# Patient Record
Sex: FEMALE | Race: WHITE | NOT HISPANIC OR LATINO | Employment: UNEMPLOYED | URBAN - METROPOLITAN AREA
[De-identification: names, ages, dates, MRNs, and addresses within clinical notes are randomized per-mention and may not be internally consistent; named-entity substitution may affect disease eponyms.]

---

## 2021-05-04 LAB
EXTERNAL HEPATITIS B SURFACE ANTIGEN: NEGATIVE
EXTERNAL HEPATITIS C AB: NEGATIVE
EXTERNAL RUBELLA QUALITATIVE: NORMAL
EXTERNAL SYPHILIS RPR SCREEN: NORMAL
HIV1 P24 AG SERPL QL IA: NEGATIVE

## 2021-09-08 ENCOUNTER — HOSPITAL ENCOUNTER (OUTPATIENT)
Facility: HOSPITAL | Age: 26
End: 2021-09-08
Attending: OBSTETRICS & GYNECOLOGY | Admitting: OBSTETRICS & GYNECOLOGY

## 2021-09-08 ENCOUNTER — HOSPITAL ENCOUNTER (EMERGENCY)
Facility: HOSPITAL | Age: 26
Discharge: HOME | End: 2021-09-08
Attending: OBSTETRICS & GYNECOLOGY | Admitting: OBSTETRICS & GYNECOLOGY

## 2021-09-08 VITALS
TEMPERATURE: 98.9 F | DIASTOLIC BLOOD PRESSURE: 66 MMHG | WEIGHT: 206.35 LBS | SYSTOLIC BLOOD PRESSURE: 130 MMHG | BODY MASS INDEX: 36.56 KG/M2 | HEART RATE: 67 BPM | OXYGEN SATURATION: 99 % | RESPIRATION RATE: 18 BRPM | HEIGHT: 63 IN

## 2021-09-08 PROCEDURE — 99283 EMERGENCY DEPT VISIT LOW MDM: CPT | Performed by: OBSTETRICS & GYNECOLOGY

## 2021-09-08 PROCEDURE — 59025 FETAL NON-STRESS TEST: CPT

## 2021-09-08 RX ORDER — PRENATAL VIT NO.126/IRON/FOLIC 28MG-0.8MG
TABLET ORAL DAILY
COMMUNITY

## 2021-11-21 ENCOUNTER — HOSPITAL ENCOUNTER (EMERGENCY)
Facility: HOSPITAL | Age: 26
Discharge: HOME | End: 2021-11-21
Attending: OBSTETRICS & GYNECOLOGY | Admitting: OBSTETRICS & GYNECOLOGY

## 2021-11-21 ENCOUNTER — HOSPITAL ENCOUNTER (OUTPATIENT)
Facility: HOSPITAL | Age: 26
End: 2021-11-21
Attending: OBSTETRICS & GYNECOLOGY | Admitting: OBSTETRICS & GYNECOLOGY

## 2021-11-21 VITALS
SYSTOLIC BLOOD PRESSURE: 128 MMHG | DIASTOLIC BLOOD PRESSURE: 72 MMHG | HEIGHT: 60 IN | OXYGEN SATURATION: 100 % | WEIGHT: 218 LBS | RESPIRATION RATE: 18 BRPM | HEART RATE: 74 BPM | BODY MASS INDEX: 42.8 KG/M2 | TEMPERATURE: 98.5 F

## 2021-11-21 LAB
BILIRUB UR QL STRIP: NEGATIVE
CLARITY UR: ABNORMAL
COLOR UR: YELLOW
GLUCOSE UR STRIP-MCNC: NEGATIVE MG/DL
HGB UR QL STRIP.AUTO: NEGATIVE
KETONES UR QL STRIP: NEGATIVE
LEUKOCYTE ESTERASE UR QL STRIP.AUTO: NEGATIVE
NITRITE UR QL STRIP: NEGATIVE
PH UR STRIP.AUTO: 7.5 [PH] (ref 5–8)
PROT UR QL STRIP: NEGATIVE
SP GR UR STRIP: 1.01 (ref 1–1.03)
UROBILINOGEN UR QL STRIP: ABNORMAL

## 2021-11-21 PROCEDURE — 81003 URINALYSIS AUTO W/O SCOPE: CPT | Performed by: OBSTETRICS & GYNECOLOGY

## 2021-11-21 PROCEDURE — 99284 EMERGENCY DEPT VISIT MOD MDM: CPT | Performed by: OBSTETRICS & GYNECOLOGY

## 2021-11-21 PROCEDURE — 59025 FETAL NON-STRESS TEST: CPT

## 2021-11-21 RX ORDER — ACETAMINOPHEN 500 MG
1000 TABLET ORAL ONCE
Status: COMPLETED | OUTPATIENT
Start: 2021-11-21 | End: 2021-11-21

## 2021-11-21 RX ADMIN — ACETAMINOPHEN 1000 MG: 500 TABLET ORAL at 22:09

## 2021-11-22 NOTE — OBED NOTES
[facility] Note OB        Patient Name: Gloria Ott  YOB: 1995  MRN: [id number]  Admission Date: 2021  9:09 PM  Date of Service: 2021    Chief Complaint: Vision Disturbance (pt is a  @ 35.1 days that came to the [facility] for blurry vision that stated this am . [facility] and it was 134/87. so she decided to come get checked out. + fm noted , denies leaking of fluid  or bleeding. pt states she santiago have a HA that she has had today but hasnt taken nay meds for it )        Subjective     Gloria Ott is a 26 y.o. female  at 35w1d with Estimated Date of Delivery: 21 who presents with the chief complaint listed above.  She sees Maddy Silva MD for her prenatal care.     She describes fetal movement as normal.  She denies rupture of membranes.  She denies vaginal bleeding. She is not feeling contractions.    States she has had a HA the whole day, and she has not taken any meds for it.  Also took her BP and was slightly elevated.  Additionally had some blurry vision.            Objective   There are no problems to display for this patient.       OB History    Para Term  AB Living   5 4 4 0 0 4   SAB IAB Ectopic Molar Multiple Live Births   0 0 0 0 0 0      # Outcome Date GA Lbr [place]/2nd Weight Sex Delivery Anes PTL Lv   5 Current            4 Term            3 Term            2 Term            1 Term                 History reviewed. No pertinent past medical history.    Past Surgical History:   Procedure Laterality Date   • CHOLECYSTECTOMY     • WISDOM TOOTH EXTRACTION         No current facility-administered medications on file prior to encounter.     Current Outpatient Medications on File Prior to Encounter   Medication Sig Dispense Refill   • prenatal vitamin (prenatal, CLASSIC, vitamin) tablet Take  by mouth Daily.         No Known Allergies    History reviewed. No pertinent family history.    Social History     Socioeconomic History   •  "Marital status:    Tobacco Use   • Smoking status: Never Smoker   • Smokeless tobacco: Never Used   Substance and Sexual Activity   • Alcohol use: Never   • Drug use: Never           Review of Systems   Constitutional: Negative for chills, fatigue and fever.   HENT: Negative.    Eyes: Negative for visual disturbance.   Respiratory: Negative for chest tightness and shortness of breath.    Cardiovascular: Negative for chest pain and palpitations.   Gastrointestinal: Negative for abdominal pain, diarrhea, nausea, rectal pain and vomiting.   Genitourinary: Negative for pelvic pain, vaginal bleeding, vaginal discharge and vaginal pain.   Neurological: Negative for headaches.          PHYSICAL EXAM:      VITAL SIGNS:  Vitals:    11/21/21 2112 11/21/21 2133   BP:  140/82   BP Location:  Left arm   Patient Position:  Sitting   Pulse:  96   Resp:  18   Temp:  98.5 °F (36.9 °C)   TempSrc:  Oral   SpO2:  100%   Weight: 98.9 kg (218 lb)    Height:  152.4 cm (60\")        FHT'S:                   Baseline:  150 BPM  Variability:  Moderate = 6 - 25 BPM  Accelerations:  15 x 15 accelerations present     Decelerations:  absent  Contractions:   absent     Interpretation:    Reactive NST, CAT 1 tracing        PHYSICAL EXAM:    General: well developed; well nourished  no acute distress   Heart: Not performed.   Lungs: breathing is unlabored     Abdomen: soft, non-tender; no masses  no umbilical or inguinal hernias are present       Cervix: was examined by nurse  Cervical Dilation (cm): 1  Cervical Effacement: 30-40%  Fetal Station: -3  Cervical Consistency: medium  Cervical Position: posterior   Contractions: irregular        Extremities: peripheral pulses normal, no pedal edema, no clubbing or cyanosis      LABS AND TESTING ORDERED:  1. Uterine and fetal monitoring  2. Urinalysis      LAB RESULTS:    Recent Results (from the past 24 hour(s))   Urinalysis With Culture If Indicated - Urine, Clean Catch    Collection Time: " "21  9:35 PM    Specimen: Urine, Clean Catch   Result Value Ref Range    Color, UA Yellow Yellow, Straw    Appearance, UA Cloudy (A) Clear    pH, UA 7.5 5.0 - 8.0    Specific Gravity, UA 1.010 1.005 - 1.030    Glucose, UA Negative Negative    Ketones, UA Negative Negative    Bilirubin, UA Negative Negative    Blood, UA Negative Negative    Protein, UA Negative Negative    Leuk Esterase, UA Negative Negative    Nitrite, UA Negative Negative    Urobilinogen, UA 1.0 E.U./dL 0.2 - 1.0 E.U./dL       No results found for: ABO, RH    No results found for: STREPGPB              Assessment/Plan     ASSESSMENT/PLAN:  Gloria Ott is a 26 y.o. female  at 35w1d who presented with headache and visual changes    She was noted to have a  Reactive NST and was watched for approximately 1 hour(s).       Final Impression:  • Pregnancy at 35w1d  •   • Reactive NST, CAT   1 tracing, Reassuring fetal status    • Maternal vital signs were reviewed and were unremarkable                   Vitals:    21 2112 21 2133   BP:  140/82   BP Location:  Left arm   Patient Position:  Sitting   Pulse:  96   Resp:  18   Temp:  98.5 °F (36.9 °C)   TempSrc:  Oral   SpO2:  100%   Weight: 98.9 kg (218 lb)    Height:  152.4 cm (60\")       • Headache  Improved with tylenol       PLAN:  • Discharge to home  • She will continue her home meds  • Pre-e precautions reviewed       Your medication list      ASK your doctor about these medications      Instructions Last Dose Given Next Dose Due   prenatal (CLASSIC) vitamin  tablet  Generic drug: prenatal vitamin      Take  by mouth Daily.                3. She will follow up with Maddy Silva MD as scheduled and as needed  4. She has been instructed to return for contractions, vaginal bleeding, Rupture of membranes, decreased fetal movement or other concern  5. She may call the office or [facility] with questions.           I have spent 60 minutes including face to face time with the " patient, greater than 50% in discussion of the diagnosis (counseling) and/or coordination of care.         Jordan Chang, DO  11/21/2021  21:48 EST  OB Hospitalist  Phone:  [phone]

## 2021-11-29 LAB — EXTERNAL GROUP B STREP ANTIGEN: NEGATIVE

## 2021-12-08 ENCOUNTER — ANESTHESIA (OUTPATIENT)
Dept: LABOR AND DELIVERY | Facility: HOSPITAL | Age: 26
End: 2021-12-08

## 2021-12-08 ENCOUNTER — ANESTHESIA EVENT (OUTPATIENT)
Dept: LABOR AND DELIVERY | Facility: HOSPITAL | Age: 26
End: 2021-12-08

## 2021-12-08 ENCOUNTER — HOSPITAL ENCOUNTER (INPATIENT)
Facility: HOSPITAL | Age: 26
LOS: 2 days | Discharge: HOME | End: 2021-12-10
Attending: OBSTETRICS & GYNECOLOGY | Admitting: OBSTETRICS & GYNECOLOGY

## 2021-12-08 PROBLEM — Z34.90 PREGNANCY: Status: ACTIVE | Noted: 2021-12-08

## 2021-12-08 LAB
A1 MICROGLOB PLACENTAL VAG QL: NEGATIVE
ABO GROUP BLD: NORMAL
BLD GP AB SCN SERPL QL: NEGATIVE
DEPRECATED RDW RBC AUTO: 41.8 FL (ref 37–54)
ERYTHROCYTE [DISTWIDTH] IN BLOOD BY AUTOMATED COUNT: 14.9 % (ref 12.3–15.4)
HCT VFR BLD AUTO: 34 % (ref 34–46.6)
HGB BLD-MCNC: 11.3 G/DL (ref 12–15.9)
MCH RBC QN AUTO: 26 PG (ref 26.6–33)
MCHC RBC AUTO-ENTMCNC: 33.2 G/DL (ref 31.5–35.7)
MCV RBC AUTO: 78.2 FL (ref 79–97)
PLATELET # BLD AUTO: 212 10*3/MM3 (ref 140–450)
PMV BLD AUTO: 9.6 FL (ref 6–12)
RBC # BLD AUTO: 4.35 10*6/MM3 (ref 3.77–5.28)
RH BLD: POSITIVE
SARS-COV-2 RNA PNL SPEC NAA+PROBE: NOT DETECTED
T&S EXPIRATION DATE: NORMAL
WBC NRBC COR # BLD: 10.6 10*3/MM3 (ref 3.4–10.8)

## 2021-12-08 PROCEDURE — 87635 SARS-COV-2 COVID-19 AMP PRB: CPT | Performed by: OBSTETRICS & GYNECOLOGY

## 2021-12-08 PROCEDURE — 86901 BLOOD TYPING SEROLOGIC RH(D): CPT | Performed by: OBSTETRICS & GYNECOLOGY

## 2021-12-08 PROCEDURE — 25010000002 ONDANSETRON PER 1 MG: Performed by: OBSTETRICS & GYNECOLOGY

## 2021-12-08 PROCEDURE — 85027 COMPLETE CBC AUTOMATED: CPT | Performed by: OBSTETRICS & GYNECOLOGY

## 2021-12-08 PROCEDURE — C1755 CATHETER, INTRASPINAL: HCPCS | Performed by: ANESTHESIOLOGY

## 2021-12-08 PROCEDURE — 84112 EVAL AMNIOTIC FLUID PROTEIN: CPT | Performed by: OBSTETRICS & GYNECOLOGY

## 2021-12-08 PROCEDURE — C1755 CATHETER, INTRASPINAL: HCPCS

## 2021-12-08 PROCEDURE — 86850 RBC ANTIBODY SCREEN: CPT | Performed by: OBSTETRICS & GYNECOLOGY

## 2021-12-08 PROCEDURE — 86900 BLOOD TYPING SEROLOGIC ABO: CPT | Performed by: OBSTETRICS & GYNECOLOGY

## 2021-12-08 RX ORDER — LIDOCAINE HYDROCHLORIDE AND EPINEPHRINE 15; 5 MG/ML; UG/ML
INJECTION, SOLUTION EPIDURAL AS NEEDED
Status: DISCONTINUED | OUTPATIENT
Start: 2021-12-08 | End: 2021-12-08 | Stop reason: SURG

## 2021-12-08 RX ORDER — SODIUM CHLORIDE 0.9 % (FLUSH) 0.9 %
10 SYRINGE (ML) INJECTION EVERY 12 HOURS SCHEDULED
Status: DISCONTINUED | OUTPATIENT
Start: 2021-12-08 | End: 2021-12-09 | Stop reason: HOSPADM

## 2021-12-08 RX ORDER — ACETAMINOPHEN 325 MG/1
650 TABLET ORAL EVERY 4 HOURS PRN
Status: DISCONTINUED | OUTPATIENT
Start: 2021-12-08 | End: 2021-12-09 | Stop reason: HOSPADM

## 2021-12-08 RX ORDER — SODIUM CHLORIDE, SODIUM LACTATE, POTASSIUM CHLORIDE, CALCIUM CHLORIDE 600; 310; 30; 20 MG/100ML; MG/100ML; MG/100ML; MG/100ML
125 INJECTION, SOLUTION INTRAVENOUS CONTINUOUS
Status: DISCONTINUED | OUTPATIENT
Start: 2021-12-08 | End: 2021-12-09

## 2021-12-08 RX ORDER — OXYTOCIN-SODIUM CHLORIDE 0.9% IV SOLN 30 UNIT/500ML 30-0.9/5 UT/ML-%
999 SOLUTION INTRAVENOUS ONCE
Status: COMPLETED | OUTPATIENT
Start: 2021-12-08 | End: 2021-12-08

## 2021-12-08 RX ORDER — PHYTONADIONE 1 MG/.5ML
INJECTION, EMULSION INTRAMUSCULAR; INTRAVENOUS; SUBCUTANEOUS
Status: ACTIVE
Start: 2021-12-08 | End: 2021-12-09

## 2021-12-08 RX ORDER — CARBOPROST TROMETHAMINE 250 UG/ML
250 INJECTION, SOLUTION INTRAMUSCULAR
Status: DISCONTINUED | OUTPATIENT
Start: 2021-12-08 | End: 2021-12-09 | Stop reason: HOSPADM

## 2021-12-08 RX ORDER — ONDANSETRON 4 MG/1
4 TABLET, FILM COATED ORAL EVERY 6 HOURS PRN
Status: DISCONTINUED | OUTPATIENT
Start: 2021-12-08 | End: 2021-12-09 | Stop reason: HOSPADM

## 2021-12-08 RX ORDER — OXYTOCIN-SODIUM CHLORIDE 0.9% IV SOLN 30 UNIT/500ML 30-0.9/5 UT/ML-%
2-20 SOLUTION INTRAVENOUS
Status: DISCONTINUED | OUTPATIENT
Start: 2021-12-08 | End: 2021-12-09 | Stop reason: HOSPADM

## 2021-12-08 RX ORDER — MISOPROSTOL 200 UG/1
800 TABLET ORAL ONCE AS NEEDED
Status: DISCONTINUED | OUTPATIENT
Start: 2021-12-08 | End: 2021-12-09 | Stop reason: HOSPADM

## 2021-12-08 RX ORDER — MAGNESIUM CARB/ALUMINUM HYDROX 105-160MG
30 TABLET,CHEWABLE ORAL ONCE
Status: DISCONTINUED | OUTPATIENT
Start: 2021-12-08 | End: 2021-12-09 | Stop reason: HOSPADM

## 2021-12-08 RX ORDER — ERYTHROMYCIN 5 MG/G
OINTMENT OPHTHALMIC
Status: ACTIVE
Start: 2021-12-08 | End: 2021-12-09

## 2021-12-08 RX ORDER — FAMOTIDINE 20 MG/1
20 TABLET, FILM COATED ORAL EVERY 12 HOURS PRN
Status: DISCONTINUED | OUTPATIENT
Start: 2021-12-08 | End: 2021-12-09 | Stop reason: HOSPADM

## 2021-12-08 RX ORDER — METHYLERGONOVINE MALEATE 0.2 MG/ML
200 INJECTION INTRAVENOUS ONCE AS NEEDED
Status: DISCONTINUED | OUTPATIENT
Start: 2021-12-08 | End: 2021-12-09 | Stop reason: HOSPADM

## 2021-12-08 RX ORDER — FAMOTIDINE 10 MG/ML
20 INJECTION, SOLUTION INTRAVENOUS EVERY 12 HOURS PRN
Status: DISCONTINUED | OUTPATIENT
Start: 2021-12-08 | End: 2021-12-09 | Stop reason: HOSPADM

## 2021-12-08 RX ORDER — ONDANSETRON 2 MG/ML
4 INJECTION INTRAMUSCULAR; INTRAVENOUS EVERY 6 HOURS PRN
Status: DISCONTINUED | OUTPATIENT
Start: 2021-12-08 | End: 2021-12-09 | Stop reason: HOSPADM

## 2021-12-08 RX ORDER — OXYTOCIN-SODIUM CHLORIDE 0.9% IV SOLN 30 UNIT/500ML 30-0.9/5 UT/ML-%
250 SOLUTION INTRAVENOUS CONTINUOUS PRN
Status: ACTIVE | OUTPATIENT
Start: 2021-12-08 | End: 2021-12-08

## 2021-12-08 RX ORDER — IBUPROFEN 600 MG/1
600 TABLET ORAL EVERY 8 HOURS PRN
Status: DISCONTINUED | OUTPATIENT
Start: 2021-12-08 | End: 2021-12-10 | Stop reason: HOSPADM

## 2021-12-08 RX ORDER — OXYTOCIN-SODIUM CHLORIDE 0.9% IV SOLN 30 UNIT/500ML 30-0.9/5 UT/ML-%
125 SOLUTION INTRAVENOUS CONTINUOUS PRN
Status: COMPLETED | OUTPATIENT
Start: 2021-12-08 | End: 2021-12-09

## 2021-12-08 RX ORDER — LIDOCAINE HYDROCHLORIDE 10 MG/ML
5 INJECTION, SOLUTION EPIDURAL; INFILTRATION; INTRACAUDAL; PERINEURAL AS NEEDED
Status: DISCONTINUED | OUTPATIENT
Start: 2021-12-08 | End: 2021-12-09 | Stop reason: HOSPADM

## 2021-12-08 RX ORDER — TERBUTALINE SULFATE 1 MG/ML
0.25 INJECTION, SOLUTION SUBCUTANEOUS AS NEEDED
Status: DISCONTINUED | OUTPATIENT
Start: 2021-12-08 | End: 2021-12-09 | Stop reason: HOSPADM

## 2021-12-08 RX ORDER — ONDANSETRON 2 MG/ML
4 INJECTION INTRAMUSCULAR; INTRAVENOUS ONCE AS NEEDED
Status: DISCONTINUED | OUTPATIENT
Start: 2021-12-08 | End: 2021-12-09 | Stop reason: HOSPADM

## 2021-12-08 RX ORDER — OXYCODONE HYDROCHLORIDE AND ACETAMINOPHEN 5; 325 MG/1; MG/1
1 TABLET ORAL EVERY 4 HOURS PRN
Status: DISCONTINUED | OUTPATIENT
Start: 2021-12-08 | End: 2021-12-10 | Stop reason: HOSPADM

## 2021-12-08 RX ORDER — SODIUM CHLORIDE 0.9 % (FLUSH) 0.9 %
10 SYRINGE (ML) INJECTION AS NEEDED
Status: DISCONTINUED | OUTPATIENT
Start: 2021-12-08 | End: 2021-12-09 | Stop reason: HOSPADM

## 2021-12-08 RX ORDER — EPHEDRINE SULFATE 50 MG/ML
5 INJECTION, SOLUTION INTRAVENOUS
Status: DISCONTINUED | OUTPATIENT
Start: 2021-12-08 | End: 2021-12-09 | Stop reason: HOSPADM

## 2021-12-08 RX ADMIN — OXYTOCIN 2 MILLI-UNITS/MIN: 10 INJECTION INTRAVENOUS at 13:03

## 2021-12-08 RX ADMIN — OXYTOCIN-SODIUM CHLORIDE 0.9% IV SOLN 30 UNIT/500ML 999 ML/HR: 30-0.9/5 SOLUTION at 22:58

## 2021-12-08 RX ADMIN — LIDOCAINE HYDROCHLORIDE AND EPINEPHRINE 3 ML: 15; 5 INJECTION, SOLUTION EPIDURAL at 17:07

## 2021-12-08 RX ADMIN — SODIUM CHLORIDE, POTASSIUM CHLORIDE, SODIUM LACTATE AND CALCIUM CHLORIDE 125 ML/HR: 600; 310; 30; 20 INJECTION, SOLUTION INTRAVENOUS at 20:54

## 2021-12-08 RX ADMIN — SODIUM CHLORIDE, POTASSIUM CHLORIDE, SODIUM LACTATE AND CALCIUM CHLORIDE 125 ML/HR: 600; 310; 30; 20 INJECTION, SOLUTION INTRAVENOUS at 12:02

## 2021-12-08 RX ADMIN — Medication 8 ML/HR: at 17:11

## 2021-12-08 RX ADMIN — SODIUM CHLORIDE, POTASSIUM CHLORIDE, SODIUM LACTATE AND CALCIUM CHLORIDE 999 ML/HR: 600; 310; 30; 20 INJECTION, SOLUTION INTRAVENOUS at 17:02

## 2021-12-08 RX ADMIN — ONDANSETRON 4 MG: 2 INJECTION INTRAMUSCULAR; INTRAVENOUS at 18:23

## 2021-12-08 NOTE — ANESTHESIA PROCEDURE NOTES
Labor Epidural      Patient reassessed immediately prior to procedure    Patient location during procedure: OB  Start Time: 12/8/2021 4:52 PM  Stop Time: 12/8/2021 5:07 PM  Performed By  Anesthesiologist: Britt Cevallos MD  Preanesthetic Checklist  Completed: patient identified, IV checked, site marked, risks and benefits discussed, surgical consent, monitors and equipment checked, pre-op evaluation and timeout performed  Additional Notes  Attempt at L4/5, encounted bone in spite of multiple trajectory changes, moved to L2/3, where catheter was ultimately placed.   Prep:  Pt Position:sitting  Sterile Tech:cap, gloves and mask  Prep:chlorhexidine gluconate and isopropyl alcohol  Monitoring:blood pressure monitoring and continuous pulse oximetry  Epidural Block Procedure:  Approach:midline  Guidance:landmark technique and palpation technique  Location:L3-L4  Needle Type:Tuohy  Needle Gauge:19 G  Loss of Resistance Medium: air (Mixed air and saline)  Loss of Resistance: 7cm  Cath Depth at skin:12 cm  Paresthesia: none  Aspiration:negative  Test Dose:negative  Number of Attempts: 2  Post Assessment:  Dressing:occlusive dressing applied and secured with tape  Pt Tolerance:patient tolerated the procedure well with no apparent complications  Complications:no

## 2021-12-08 NOTE — ANESTHESIA PREPROCEDURE EVALUATION
Anesthesia Evaluation     Patient summary reviewed and Nursing notes reviewed                Airway   Mallampati: II  TM distance: >3 FB  Neck ROM: full  Dental - normal exam     Pulmonary - negative pulmonary ROS and normal exam   Cardiovascular - negative cardio ROS and normal exam  Exercise tolerance: good (4-7 METS)        Neuro/Psych- negative ROS  GI/Hepatic/Renal/Endo    (+) morbid obesity,      Musculoskeletal (-) negative ROS    Abdominal    Substance History - negative use     OB/GYN    (+) Pregnant,         Other                        Anesthesia Plan    ASA 2     epidural   (37w4d)    Anesthetic plan, all risks, benefits, and alternatives have been provided, discussed and informed consent has been obtained with: patient.

## 2021-12-08 NOTE — H&P
[facility]  Obstetric History and Physical    Patient Name: Gloria Ott  :  1995  MRN:  [id number]      Chief Complaint   Patient presents with   • Rupture of Membranes     Possible rupture of membranes. Pt sent from the office with + nitrazine and fern. Sending ROM+. +FM, has felt leaking of fluid since around midnight, -VB. Feels contractions irregularly.        Subjective     Patient is a 26 y.o. female  currently at 37w4d, who presents from clinic with LOF since midnight. Spec down in clinic with pos fern, pos nitrazine.  Had ROM+ sent on admission which was negative - however pt now grossly ruptured.       Her prenatal care has been uncomplicated    Objective       Vital Signs Range for the last 24 hours  Temperature: Temp:  [98 °F (36.7 °C)] 98 °F (36.7 °C)   Temp Source: Temp src: Oral   BP: BP: (106-122)/(56-84) 106/56   Pulse: Heart Rate:  [74-89] 74   Respirations: Resp:  [18] 18                   Physical Examination:     General :  Alert in NAD  Abdomen: Gravid, EFW 7lbs by leopolds    Presentation: [facility]   Cervix: Exam by:   in clinic /   Dilation:     Effacement:     Station:         Fetal Heart Rate Assessment   Method: Fetal HR Assessment Method: external   Beats/min: Fetal HR (beats/min): 120   Baseline: Fetal Heart Baseline Rate: normal range   Varibility: Fetal HR Variability: moderate (amplitude range 6 to 25 bpm)   Accels: Fetal HR Accelerations: greater than/equal to 15 bpm, lasting at least 15 seconds   Decels: Fetal HR Decelerations: late   Tracing Category:       Uterine Assessment   Method: Method: palpation, external tocotransducer   Frequency (min): Contraction Frequency (Minutes): x1   Ctx Count in 10 min:     Duration:     Intensity: Contraction Intensity: mild by palpation   Intensity by IUPC:     Resting Tone: Uterine Resting Tone: soft by palpation   Resting Tone by IUPC:     Emery Units:           Results from last 7 days   Lab Units 21  1208   WBC  10*3/mm3 10.60   HEMOGLOBIN g/dL 11.3*   HEMATOCRIT % 34.0   PLATELETS 10*3/mm3 212       Assessment/Plan     1.  Intrauterine pregnancy at 37w4d weeks gestation with SROM.   reactive fetal status. GBS neg. Will augment with pitocin. Anticipate .  Plan of care has been reviewed with patient and family.  All questions answered.      Pregnancy        H&P updated. The patient was examined and the following changes are noted above.         Britt Lee MD  2021  13:23 EST

## 2021-12-08 NOTE — PLAN OF CARE
Problem: Adult Inpatient Plan of Care  Goal: Plan of Care Review  Outcome: Ongoing, Progressing  Flowsheets (Taken 2021 1405)  Progress: no change  Plan of Care Reviewed With:   patient   spouse  Outcome Summary: Admitted with AROM clear fld at midnight today (21) this 25 y/o  at 37 4/7 wks gestation pt of Dr. Silva.  Pitocin Augmentation started and currently at 4 mu/min.  Comfortable.  Goal: Patient-Specific Goal (Individualized)  Outcome: Ongoing, Progressing  Flowsheets (Taken 2021 1405)  Patient-Specific Goals (Include Timeframe): Pain management, initiate bonding  Individualized Care Needs: Explain procedures and keep informed of POC  Anxieties, Fears or Concerns: Pain  Goal: Absence of Hospital-Acquired Illness or Injury  Outcome: Ongoing, Progressing  Goal: Optimal Comfort and Wellbeing  Outcome: Ongoing, Progressing  Goal: Readiness for Transition of Care  Outcome: Ongoing, Progressing     Problem: Bleeding (Labor)  Goal: Hemostasis  Outcome: Ongoing, Progressing     Problem: Change in Fetal Wellbeing (Labor)  Goal: Stable Fetal Wellbeing  Outcome: Ongoing, Progressing     Problem: Delayed Labor Progression (Labor)  Goal: Effective Progression to Delivery  Outcome: Ongoing, Progressing     Problem: Infection (Labor)  Goal: Absence of Infection Signs and Symptoms  Outcome: Ongoing, Progressing     Problem: Labor Pain (Labor)  Goal: Acceptable Pain Control  Outcome: Ongoing, Progressing     Problem: Uterine Tachysystole (Labor)  Goal: Normal Uterine Contraction Pattern  Outcome: Ongoing, Progressing   Goal Outcome Evaluation:  Plan of Care Reviewed With: patient, spouse        Progress: no change  Outcome Summary: Admitted with AROM clear fld at midnight today (21) this 25 y/o  at 37 4/7 wks gestation pt of Dr. Silva.  Pitocin Augmentation started and currently at 4 mu/min.  Comfortable.

## 2021-12-09 LAB
BASOPHILS # BLD AUTO: 0.02 10*3/MM3 (ref 0–0.2)
BASOPHILS NFR BLD AUTO: 0.2 % (ref 0–1.5)
DEPRECATED RDW RBC AUTO: 42.9 FL (ref 37–54)
EOSINOPHIL # BLD AUTO: 0.07 10*3/MM3 (ref 0–0.4)
EOSINOPHIL NFR BLD AUTO: 0.6 % (ref 0.3–6.2)
ERYTHROCYTE [DISTWIDTH] IN BLOOD BY AUTOMATED COUNT: 14.8 % (ref 12.3–15.4)
HCT VFR BLD AUTO: 33 % (ref 34–46.6)
HGB BLD-MCNC: 10.6 G/DL (ref 12–15.9)
IMM GRANULOCYTES # BLD AUTO: 0.04 10*3/MM3 (ref 0–0.05)
IMM GRANULOCYTES NFR BLD AUTO: 0.3 % (ref 0–0.5)
LYMPHOCYTES # BLD AUTO: 1.77 10*3/MM3 (ref 0.7–3.1)
LYMPHOCYTES NFR BLD AUTO: 14.7 % (ref 19.6–45.3)
MCH RBC QN AUTO: 25.6 PG (ref 26.6–33)
MCHC RBC AUTO-ENTMCNC: 32.1 G/DL (ref 31.5–35.7)
MCV RBC AUTO: 79.7 FL (ref 79–97)
MONOCYTES # BLD AUTO: 0.93 10*3/MM3 (ref 0.1–0.9)
MONOCYTES NFR BLD AUTO: 7.7 % (ref 5–12)
NEUTROPHILS NFR BLD AUTO: 76.5 % (ref 42.7–76)
NEUTROPHILS NFR BLD AUTO: 9.23 10*3/MM3 (ref 1.7–7)
NRBC BLD AUTO-RTO: 0 /100 WBC (ref 0–0.2)
PLATELET # BLD AUTO: 198 10*3/MM3 (ref 140–450)
PMV BLD AUTO: 9.4 FL (ref 6–12)
RBC # BLD AUTO: 4.14 10*6/MM3 (ref 3.77–5.28)
WBC NRBC COR # BLD: 12.06 10*3/MM3 (ref 3.4–10.8)

## 2021-12-09 PROCEDURE — 85025 COMPLETE CBC W/AUTO DIFF WBC: CPT | Performed by: OBSTETRICS & GYNECOLOGY

## 2021-12-09 RX ORDER — SODIUM CHLORIDE 0.9 % (FLUSH) 0.9 %
1-10 SYRINGE (ML) INJECTION AS NEEDED
Status: DISCONTINUED | OUTPATIENT
Start: 2021-12-09 | End: 2021-12-10 | Stop reason: HOSPADM

## 2021-12-09 RX ORDER — OXYCODONE AND ACETAMINOPHEN 10; 325 MG/1; MG/1
1 TABLET ORAL EVERY 4 HOURS PRN
Status: DISCONTINUED | OUTPATIENT
Start: 2021-12-09 | End: 2021-12-10 | Stop reason: HOSPADM

## 2021-12-09 RX ORDER — DOCUSATE SODIUM 100 MG/1
100 CAPSULE, LIQUID FILLED ORAL 2 TIMES DAILY
Status: DISCONTINUED | OUTPATIENT
Start: 2021-12-09 | End: 2021-12-10 | Stop reason: HOSPADM

## 2021-12-09 RX ORDER — BISACODYL 10 MG
10 SUPPOSITORY, RECTAL RECTAL DAILY PRN
Status: DISCONTINUED | OUTPATIENT
Start: 2021-12-09 | End: 2021-12-10 | Stop reason: HOSPADM

## 2021-12-09 RX ORDER — LANOLIN
1 CREAM (ML) TOPICAL
Status: DISCONTINUED | OUTPATIENT
Start: 2021-12-09 | End: 2021-12-10 | Stop reason: HOSPADM

## 2021-12-09 RX ORDER — DIAPER,BRIEF,INFANT-TODD,DISP
1 EACH MISCELLANEOUS EVERY 12 HOURS SCHEDULED
Status: DISCONTINUED | OUTPATIENT
Start: 2021-12-09 | End: 2021-12-10 | Stop reason: HOSPADM

## 2021-12-09 RX ORDER — PRENATAL VIT/IRON FUM/FOLIC AC 27MG-0.8MG
1 TABLET ORAL DAILY
Status: DISCONTINUED | OUTPATIENT
Start: 2021-12-09 | End: 2021-12-10 | Stop reason: HOSPADM

## 2021-12-09 RX ORDER — HYDROCORTISONE 25 MG/G
1 CREAM TOPICAL AS NEEDED
Status: DISCONTINUED | OUTPATIENT
Start: 2021-12-09 | End: 2021-12-10 | Stop reason: HOSPADM

## 2021-12-09 RX ORDER — ONDANSETRON 4 MG/1
4 TABLET, FILM COATED ORAL EVERY 8 HOURS PRN
Status: DISCONTINUED | OUTPATIENT
Start: 2021-12-09 | End: 2021-12-10 | Stop reason: HOSPADM

## 2021-12-09 RX ADMIN — OXYTOCIN 125 ML/HR: 10 INJECTION INTRAVENOUS at 00:17

## 2021-12-09 RX ADMIN — DOCUSATE SODIUM 100 MG: 100 CAPSULE, LIQUID FILLED ORAL at 09:00

## 2021-12-09 RX ADMIN — PRENATAL VITAMINS-IRON FUMARATE 27 MG IRON-FOLIC ACID 0.8 MG TABLET 1 TABLET: at 09:00

## 2021-12-09 RX ADMIN — DOCUSATE SODIUM 100 MG: 100 CAPSULE, LIQUID FILLED ORAL at 19:49

## 2021-12-09 RX ADMIN — IBUPROFEN 600 MG: 600 TABLET, FILM COATED ORAL at 13:48

## 2021-12-09 RX ADMIN — IBUPROFEN 600 MG: 600 TABLET, FILM COATED ORAL at 02:07

## 2021-12-09 RX ADMIN — OXYCODONE AND ACETAMINOPHEN 1 TABLET: 5; 325 TABLET ORAL at 19:15

## 2021-12-09 RX ADMIN — Medication 1 APPLICATION: at 02:07

## 2021-12-09 RX ADMIN — HYDROCORTISONE 1 APPLICATION: 1 CREAM TOPICAL at 17:06

## 2021-12-09 NOTE — PLAN OF CARE
Problem: Adult Inpatient Plan of Care  Goal: Plan of Care Review  Outcome: Ongoing, Progressing  Flowsheets (Taken 12/9/2021 0505)  Progress: improving  Plan of Care Reviewed With: patient  Outcome Summary: VSS, assessment WDL, ambulated and voided since delivery of infant, pain controlled with ibuprofen  Goal: Patient-Specific Goal (Individualized)  Outcome: Ongoing, Progressing  Goal: Absence of Hospital-Acquired Illness or Injury  Outcome: Ongoing, Progressing  Intervention: Identify and Manage Fall Risk  Recent Flowsheet Documentation  Taken 12/9/2021 0440 by Evie Limon RN  Safety Promotion/Fall Prevention: safety round/check completed  Taken 12/9/2021 0400 by Evie Limon RN  Safety Promotion/Fall Prevention: safety round/check completed  Taken 12/9/2021 0315 by Evie Limon RN  Safety Promotion/Fall Prevention: safety round/check completed  Taken 12/9/2021 0207 by Evie Limon RN  Safety Promotion/Fall Prevention: safety round/check completed  Taken 12/9/2021 0130 by Evie Limon RN  Safety Promotion/Fall Prevention:   safety round/check completed   clutter free environment maintained  Intervention: Prevent Skin Injury  Recent Flowsheet Documentation  Taken 12/9/2021 0130 by Evie Limon RN  Body Position: position changed independently  Goal: Optimal Comfort and Wellbeing  Outcome: Ongoing, Progressing  Intervention: Provide Person-Centered Care  Recent Flowsheet Documentation  Taken 12/9/2021 0130 by Evie Limon RN  Trust Relationship/Rapport:   care explained   questions answered   questions encouraged   choices provided  Goal: Readiness for Transition of Care  Outcome: Ongoing, Progressing   Goal Outcome Evaluation:  Plan of Care Reviewed With: patient        Progress: improving  Outcome Summary: VSS, assessment WDL, ambulated and voided since delivery of infant, pain controlled with ibuprofen

## 2021-12-09 NOTE — PLAN OF CARE
Goal Outcome Evaluation:  Plan of Care Reviewed With: patient   VS stable. Funmdus fiorm. No excessive lochia. No blood clots. Prn po motrin effective for paian relief. Breastfeeding well. States her milk is  coming in but no co of engorgement. + bonding with . Ambulates in room. Voids without difficulty

## 2021-12-09 NOTE — PAYOR COMM NOTE
"Gloria Ott (26 y.o. Female)     [facility]    [address] [place] [address]  Facility NPI: [id number]    Vin Hein  Fax: [phone]  Phone: [phone] (Marielena: 3772)    Subject: MATERNITY ADMISSION AUTH REQUEST CLINICALS     Reference #:  [id number]    Please don't hesitate to contact me with any questions or concerns.                  Date of Birth Social Security Number Address Home Phone MRN    1995  [id number] [facility] [phone] [id number]    Orthodox Marital Status             None        Admission Date Admission Type Admitting Provider Attending Provider Department, Room/Bed    12/8/21 Elective Britt Lee MD Miller, Kelli M, MD [facility], S304/1    Discharge Date Discharge Disposition Discharge Destination                         Attending Provider: Maddy Silva MD    Allergies: No Known Allergies    Isolation: None   Infection: None   Code Status: CPR   Advance Care Planning Activity    Ht: 152.4 cm (60\")   Wt: 99.3 kg (219 lb)    Admission Cmt: None   Principal Problem: None                Active Insurance as of 12/8/2021     Primary Coverage     Payor Plan Insurance Group Employer/Plan Group    ANTHEM BLUE CROSS ANTHEM BLUE CROSS BLUE SHIELD PPO [id number]     Payor Plan Address Payor Plan Phone Number Payor Plan Fax Number Effective Dates    [address] [phone]  1/1/2021 - None Entered    [facility] [id number]       Subscriber Name Subscriber Birth Date Member ID       WHIT OTT 12/18/1992 [id number]           Secondary Coverage     Payor Plan Insurance Group Employer/Plan Group    [employer] [place]      Payor Plan Address Payor Plan Phone Number Payor Plan Fax Number Effective Dates    [address] [address] [phone]  9/8/2021 - None Entered    [facility] [id number]       Subscriber Name Subscriber Birth Date Member ID       GLORIA OTT 1995 [id number]                 Emergency " Contacts      (Rel.) Home Phone Work Phone Mobile Phone    WHIT OTT (Spouse) [phone] -- [phone]               History & Physical      Britt Lee MD at 21 1323          [facility]  Obstetric History and Physical    Patient Name: Gloria Ott  :  1995  MRN:  [id number]      Chief Complaint   Patient presents with   • Rupture of Membranes     Possible rupture of membranes. Pt sent from the office with + nitrazine and fern. Sending ROM+. +FM, has felt leaking of fluid since around midnight, -VB. Feels contractions irregularly.        Subjective     Patient is a 26 y.o. female  currently at 37w4d, who presents from clinic with LOF since midnight. Spec down in clinic with pos fern, pos nitrazine.  Had ROM+ sent on admission which was negative - however pt now grossly ruptured.       Her prenatal care has been uncomplicated    Objective       Vital Signs Range for the last 24 hours  Temperature: Temp:  [98 °F (36.7 °C)] 98 °F (36.7 °C)   Temp Source: Temp src: Oral   BP: BP: (106-122)/(56-84) 106/56   Pulse: Heart Rate:  [74-89] 74   Respirations: Resp:  [18] 18                   Physical Examination:     General :  Alert in NAD  Abdomen: Gravid, EFW 7lbs by leopolds    Presentation: ceph   Cervix: Exam by:   in clinic /-   Dilation:     Effacement:     Station:         Fetal Heart Rate Assessment   Method: Fetal HR Assessment Method: external   Beats/min: Fetal HR (beats/min): 120   Baseline: Fetal Heart Baseline Rate: normal range   Varibility: Fetal HR Variability: moderate (amplitude range 6 to 25 bpm)   Accels: Fetal HR Accelerations: greater than/equal to 15 bpm, lasting at least 15 seconds   Decels: Fetal HR Decelerations: late   Tracing Category:       Uterine Assessment   Method: Method: palpation, external tocotransducer   Frequency (min): Contraction Frequency (Minutes): x1   Ctx Count in 10 min:     Duration:     Intensity: Contraction Intensity:  mild by palpation   Intensity by IUPC:     Resting Tone: Uterine Resting Tone: soft by palpation   Resting Tone by IUPC:     [place] Units:           Results from last 7 days   Lab Units 21  1208   WBC 10*3/mm3 10.60   HEMOGLOBIN g/dL 11.3*   HEMATOCRIT % 34.0   PLATELETS 10*3/mm3 212       Assessment/Plan     1.  Intrauterine pregnancy at 37w4d weeks gestation with SROM.   reactive fetal status. GBS neg. Will augment with pitocin. Anticipate .  Plan of care has been reviewed with patient and family.  All questions answered.      Pregnancy        H&P updated. The patient was examined and the following changes are noted above.         Britt Lee MD  2021  13:23 EST        Electronically signed by Britt Lee MD at 21 1325     H&P signed by [employer], [place] at 21 1348      Scan on 2021 by [employer], [place]: PRENATAL RECORD , WOMEN FIRST, 2021          Electronically signed by [employer], [place] at 21 1348         Facility-Administered Medications as of 2021   Medication Dose Route Frequency Provider Last Rate Last Admin   • all purpose nipple ointment 1 application  1 application Topical 4x Daily PRN Britt Lee MD       • benzocaine (AMERICAINE) 20 % rectal ointment 1 application  1 application Rectal PRN Britt Lee MD       • benzocaine-menthol (DERMOPLAST) 20-0.5 % topical spray   Topical PRN Britt Lee MD       • bisacodyl (DULCOLAX) suppository 10 mg  10 mg Rectal Daily PRN Britt Lee MD       • docusate sodium (COLACE) capsule 100 mg  100 mg Oral BID Britt Lee MD       • [] erythromycin (ROMYCIN) 5 MG/GM ophthalmic ointment  - ADS Override Pull            • Hydrocort-Pramoxine (Perianal) (PROCTOFOAM-HS) 1-1 % rectal foam 1 application  1 application Topical PRN Britt Lee MD       • Hydrocortisone (Perianal) (ANUSOL-HC) 2.5 % rectal cream 1 application  1 application Rectal PRN Britt Lee MD        • ibuprofen (ADVIL,MOTRIN) tablet 600 mg  600 mg Oral Q8H PRN Britt Lee MD   600 mg at 21 020   • lanolin cream 1 application  1 application Topical Q1H PRN Britt Lee MD   1 application at 21   • magnesium hydroxide (MILK OF MAGNESIA) suspension 10 mL  10 mL Oral Daily PRN Britt Lee MD       • ondansetron (ZOFRAN) tablet 4 mg  4 mg Oral Q8H PRN Britt Lee MD       • oxyCODONE-acetaminophen (PERCOCET)  MG per tablet 1 tablet  1 tablet Oral Q4H PRN Britt Lee MD       • oxyCODONE-acetaminophen (PERCOCET) 5-325 MG per tablet 1 tablet  1 tablet Oral Q4H PRN Britt Lee MD       • [COMPLETED] oxytocin in sodium chloride (PITOCIN) 30 UNIT/500ML infusion solution  999 mL/hr Intravenous Once Britt Lee MD   Stopped at 21    Followed by   • [] oxytocin in sodium chloride (PITOCIN) 30 UNIT/500ML infusion solution  250 mL/hr Intravenous Continuous PRN Britt Lee MD   Stopped at 21    Followed by   • [COMPLETED] oxytocin in sodium chloride (PITOCIN) 30 UNIT/500ML infusion solution  125 mL/hr Intravenous Continuous PRN Britt Lee  mL/hr at 21 125 mL/hr at 21   • [] phytonadione (VITAMIN K) 1 MG/0.5ML injection  - ADS Override Pull            • prenatal vitamin tablet 1 tablet  1 tablet Oral Daily Britt Lee MD       • sodium chloride 0.9 % flush 1-10 mL  1-10 mL Intravenous PRN Britt Lee MD         Lab Results (last 72 hours)     Procedure Component Value Units Date/Time    CBC & Differential [[id number]]  (Abnormal) Collected: 21    Specimen: Blood Updated: 21    Narrative:      The following orders were created for panel order CBC & Differential.  Procedure                               Abnormality         Status                     ---------                               -----------         ------                     CBC Auto Differential[[id number]]         Abnormal            Final result                 Please view results for these tests on the individual orders.    CBC Auto Differential [[id number]]  (Abnormal) Collected: 12/09/21 0821    Specimen: Blood Updated: 12/09/21 0900     WBC 12.06 10*3/mm3      RBC 4.14 10*6/mm3      Hemoglobin 10.6 g/dL      Hematocrit 33.0 %      MCV 79.7 fL      MCH 25.6 pg      MCHC 32.1 g/dL      RDW 14.8 %      RDW-SD 42.9 fl      MPV 9.4 fL      Platelets 198 10*3/mm3      Neutrophil % 76.5 %      Lymphocyte % 14.7 %      Monocyte % 7.7 %      Eosinophil % 0.6 %      Basophil % 0.2 %      Immature Grans % 0.3 %      Neutrophils, Absolute 9.23 10*3/mm3      Lymphocytes, Absolute 1.77 10*3/mm3      Monocytes, Absolute 0.93 10*3/mm3      Eosinophils, Absolute 0.07 10*3/mm3      Basophils, Absolute 0.02 10*3/mm3      Immature Grans, Absolute 0.04 10*3/mm3      nRBC 0.0 /100 WBC     Rubella Antibody, IgG [[id number]] Resulted: 05/04/21    Specimen: Blood Updated: 12/08/21 1246     External Rubella Qual Immune    HIV-1 Antibody, EIA [[id number]] Resulted: 05/04/21    Specimen: Blood Updated: 12/08/21 1246     External HIV Antibody Negative    Group B Streptococcus Culture - Swab, Vaginal/Rectum [[id number]] Resulted: 11/29/21    Specimen: Swab from Vaginal/Rectum Updated: 12/08/21 1246     External Strep Group B Ag Negative    Hepatitis C Antibody [[id number]] Resulted: 05/04/21    Specimen: Blood Updated: 12/08/21 1246     External Hepatitis C Ab Negative    Hepatitis B Surface Antigen [[id number]] Resulted: 05/04/21    Specimen: Blood Updated: 12/08/21 1246     External Hepatitis B Surface Ag Negative    RPR [[id number]] Resulted: 05/04/21    Specimen: Blood Updated: 12/08/21 1246     External RPR Non-Reactive    COVID PRE-OP / PRE-PROCEDURE SCREENING ORDER (NO ISOLATION) - Swab, Nasopharynx [[id number]]  (Normal) Collected: 12/08/21 1208    Specimen: Swab from Nasopharynx Updated: 12/08/21 1235    Narrative:      The following orders  were created for panel order COVID PRE-OP / PRE-PROCEDURE SCREENING ORDER (NO ISOLATION) - Swab, Nasopharynx.  Procedure                               Abnormality         Status                     ---------                               -----------         ------                     COVID-19,[facility] IN-HOUSE...[[id number]]  Normal              Final result                 Please view results for these tests on the individual orders.    COVID-19,[facility] IN-HOUSE CEPHEID/ANGELA NP SWAB IN TRANSPORT MEDIA 8-12 HR TAT - Swab, Nasopharynx [[id number]]  (Normal) Collected: 12/08/21 1208    Specimen: Swab from Nasopharynx Updated: 12/08/21 1235     COVID19 Not Detected    Narrative:      Fact sheet for providers: https://www.fda.gov/media/779544/download    Fact sheet for patients: https://www.fda.gov/media/887202/download    Test performed by PCR.    CBC (No Diff) [[id number]]  (Abnormal) Collected: 12/08/21 1208    Specimen: Blood Updated: 12/08/21 1221     WBC 10.60 10*3/mm3      RBC 4.35 10*6/mm3      Hemoglobin 11.3 g/dL      Hematocrit 34.0 %      MCV 78.2 fL      MCH 26.0 pg      MCHC 33.2 g/dL      RDW 14.9 %      RDW-SD 41.8 fl      MPV 9.6 fL      Platelets 212 10*3/mm3     Rapid Assay For ROM - Amniotic Fluid, Amniotic Sac [[id number]]  (Normal) Collected: 12/08/21 1004    Specimen: Amniotic Fluid from Amniotic Sac Updated: 12/08/21 1116     Rupture of Membranes Negative          Imaging Results (Last 72 Hours)     ** No results found for the last 72 hours. **        ECG/EMG Results (last 72 hours)     ** No results found for the last 72 hours. **        Orders (last 72 hrs)      Start     Ordered    12/09/21 0900  prenatal vitamin tablet 1 tablet  Daily         12/09/21 0149 12/09/21 0900  docusate sodium (COLACE) capsule 100 mg  2 Times Daily         12/09/21 0149 12/09/21 0800  Sitz Bath  3 Times Daily        Comments: PRN    12/09/21 0149 12/09/21 0600  CBC & Differential  Morning Draw        Comments:  Postpartum Day 1      12/09/21 0149 12/09/21 0600  CBC Auto Differential  PROCEDURE ONCE        Comments: Postpartum Day 1      12/09/21 0149 12/09/21 0150  Transfer to Postpartum When Criteria Met  Continuous         12/09/21 0149 12/09/21 0150  Transfer Patient  Once         12/09/21 0149 12/09/21 0150  Code Status and Medical Interventions:  Continuous         12/09/21 0149 12/09/21 0150  Vital Signs Per Hospital Policy  Per Hospital Policy         12/09/21 0149 12/09/21 0150  Notify Physician  Until Discontinued         12/09/21 0149 12/09/21 0150  Up Ad Jina  Until Discontinued         12/09/21 0149 12/09/21 0150  Advance Diet as Tolerated  Until Discontinued         12/09/21 0149 12/09/21 0150  Fundal and Lochia Check  Per Order Details        Comments: Every 30 minutes x2, then Every Shift    12/09/21 0149 12/09/21 0150  RN to Assess Rh Status & Place RhIG Evaluation Order if Indicated  Continuous         12/09/21 0149 12/09/21 0150  Bladder Assessment  Per Order Details        Comments: Postpartum 1) Upon Admission to Unit & Every 4 Hours PRN Until Voiding. 2) Out of Bed to Void in 8 Hours.    12/09/21 0149 12/09/21 0150  Straight Cath  Per Order Details        Comments: Postpartum: If Distended & Unable to Void, May Repeat Once.    12/09/21 0149 12/09/21 0150  Indwelling Urinary Catheter  Per Order Details        Comments: Postpartum : After Straight Cathed x2 or if Greater Than 1000mL Residual, Insert Indwelling Urinary Catheter Until Further MD Order.    12/09/21 0149 12/09/21 0150  Remove Vaginal Packing  Once         12/09/21 0149 12/09/21 0150  Apply Ice to Perineum  Per Order Details        Comments: For 20 Minutes Every 2 Hours    12/09/21 0149 12/09/21 0150  Waffle Cushion  Per Order Details        Comments: For Perineal Discomfort    12/09/21 0149 12/09/21 0150  Donut Ring  Per Order Details        Comments: For Perineal Pain    12/09/21 0149     12/09/21 0150  Kpad  Per Order Details        Comments: For Pain    12/09/21 0149 12/09/21 0150  Breast pump to bed  Once         12/09/21 0149 12/09/21 0150  If indicated -- Please administer RH Immunoglobulin based on results of cord blood evaluation and fetal screen lab tests, pharmacy to dispense  Per Order Details        Comments: See Process Instructions For Reference Range Details.    12/09/21 0149 12/09/21 0150  Place Sequential Compression Device  Once         12/09/21 0149 12/09/21 0150  Maintain Sequential Compression Device  Continuous         12/09/21 0149 12/09/21 0149  Ambulate Patient  Every Shift       12/09/21 0149 12/09/21 0149  sodium chloride 0.9 % flush 1-10 mL  As Needed         12/09/21 0149 12/09/21 0149  bisacodyl (DULCOLAX) suppository 10 mg  Daily PRN         12/09/21 0149 12/09/21 0149  magnesium hydroxide (MILK OF MAGNESIA) suspension 10 mL  Daily PRN         12/09/21 0149 12/09/21 0149  benzocaine-menthol (DERMOPLAST) 20-0.5 % topical spray  As Needed         12/09/21 0149 12/09/21 0149  Hydrocort-Pramoxine (Perianal) (PROCTOFOAM-HS) 1-1 % rectal foam 1 application  As Needed         12/09/21 0149 12/09/21 0149  Hydrocortisone (Perianal) (ANUSOL-HC) 2.5 % rectal cream 1 application  As Needed         12/09/21 0149 12/09/21 0149  benzocaine (AMERICAINE) 20 % rectal ointment 1 application  As Needed         12/09/21 0149 12/09/21 0149  oxyCODONE-acetaminophen (PERCOCET)  MG per tablet 1 tablet  Every 4 Hours PRN         12/09/21 0149 12/09/21 0149  ondansetron (ZOFRAN) tablet 4 mg  Every 8 Hours PRN         12/09/21 0149 12/09/21 0149  lanolin cream 1 application  Every 1 Hour PRN         12/09/21 0149 12/09/21 0149  all purpose nipple ointment 1 application  4 Times Daily PRN         12/09/21 0149 12/08/21 2344  ibuprofen (ADVIL,MOTRIN) tablet 600 mg  Every 8 Hours PRN         12/08/21 2344    12/08/21 2345   "oxyCODONE-acetaminophen (PERCOCET) 5-325 MG per tablet 1 tablet  Every 4 Hours PRN         12/08/21 2344    12/08/21 2212  erythromycin (ROMYCIN) 5 MG/GM ophthalmic ointment  - ADS Override Pull        Note to Pharmacy: Created by cabinet override    12/08/21 2212 12/08/21 2212  phytonadione (VITAMIN K) 1 MG/0.5ML injection  - ADS Override Pull        Note to Pharmacy: Created by cabinet override    12/08/21 2212 12/08/21 2119  oxytocin in sodium chloride (PITOCIN) 30 UNIT/500ML infusion solution  Continuous PRN        \"Followed by\" Linked Group Details    12/08/21 1918 12/08/21 2019  oxytocin in sodium chloride (PITOCIN) 30 UNIT/500ML infusion solution  Continuous PRN        \"Followed by\" Linked Group Details    12/08/21 1918 12/08/21 1919  Notify Provider (Specified)  Until Discontinued,   Status:  Canceled         12/08/21 1918 12/08/21 1919  Vital Signs Per Hospital Policy  Per Hospital Policy,   Status:  Canceled         12/08/21 1918 12/08/21 1919  Fundal & Lochia Check  Per Order Details,   Status:  Canceled        Comments: Every 15 Minutes x4, Then Every 30 Minutes x2, Then Every Shift    12/08/21 1918 12/08/21 1919  Fundal & Lochia Check  Every Shift,   Status:  Canceled       12/08/21 1918 12/08/21 1919  Diet Regular  Diet Effective Now         12/08/21 1918 12/08/21 1918  oxytocin in sodium chloride (PITOCIN) 30 UNIT/500ML infusion solution  Once        \"Followed by\" Linked Group Details    12/08/21 1918 12/08/21 1918  methylergonovine (METHERGINE) injection 200 mcg  Once As Needed,   Status:  Discontinued         12/08/21 1918 12/08/21 1918  carboprost (HEMABATE) injection 250 mcg  Every 15 Minutes PRN,   Status:  Discontinued         12/08/21 1918 12/08/21 1918  miSOPROStol (CYTOTEC) tablet 800 mcg  Once As Needed,   Status:  Discontinued         12/08/21 1918 12/08/21 1400  lactated ringers bolus 1,000 mL  Once,   Status:  Discontinued         12/08/21 1309    " 12/08/21 1400  fentaNYL (2 mcg/mL) and ropivacaine (0.2%) in 100 mL  Continuous,   Status:  Discontinued         12/08/21 1309    12/08/21 1310  Vital Signs Per Anesthesia Guidelines  Per Order Details,   Status:  Canceled        Comments: Every 2 Minutes x5, Every 5 Minutes x4, Then If Stable Every 15 Minutes    12/08/21 1309    12/08/21 1310  Start IV (16 or 18 Gauge)  Once,   Status:  Canceled         12/08/21 1309    12/08/21 1310  Cardiac Monitoring  Continuous,   Status:  Canceled         12/08/21 1309    12/08/21 1310  Fetal Heart Rate Monitor  Once,   Status:  Canceled         12/08/21 1309    12/08/21 1310  Nurse or Anesthesiologist to Remain With Patient for 15 Minutes Following Dosing  Continuous,   Status:  Canceled         12/08/21 1309    12/08/21 1310  Facilitate Maternal Position on Side & Maintain Uterine Displacement  Continuous,   Status:  Canceled         12/08/21 1309    12/08/21 1310  Consult Anesthesia Prior to Changing Epidural Infusion / Rate  Continuous,   Status:  Canceled         12/08/21 1309    12/08/21 1310  Notify Provider  Until Discontinued,   Status:  Canceled         12/08/21 1309    12/08/21 1309  ePHEDrine injection 5 mg  Every 10 Minutes PRN,   Status:  Discontinued         12/08/21 1309    12/08/21 1309  ondansetron (ZOFRAN) injection 4 mg  Once As Needed,   Status:  Discontinued         12/08/21 1309    12/08/21 1230  sodium chloride 0.9 % flush 10 mL  Every 12 Hours Scheduled,   Status:  Discontinued         12/08/21 1136    12/08/21 1230  mineral oil liquid 30 mL  Once,   Status:  Discontinued         12/08/21 1136    12/08/21 1230  lactated ringers infusion  Continuous,   Status:  Discontinued         12/08/21 1136    12/08/21 1230  oxytocin in sodium chloride (PITOCIN) 30 UNIT/500ML infusion solution  Titrated,   Status:  Discontinued         12/08/21 1136    12/08/21 1138  COVID PRE-OP / PRE-PROCEDURE SCREENING ORDER (NO ISOLATION) - Swab, Nasopharynx  Once          "12/08/21 1137    12/08/21 1138  COVID-19,[facility]-HOUSE CEPHEID/ANGELA NP SWAB IN TRANSPORT MEDIA 8-12 HR TAT - Swab, Nasopharynx  PROCEDURE ONCE         12/08/21 1137    12/08/21 1136  famotidine (PEPCID) injection 20 mg  Every 12 Hours PRN,   Status:  Discontinued        \"Or\" Linked Group Details    12/08/21 1136    12/08/21 1136  famotidine (PEPCID) tablet 20 mg  Every 12 Hours PRN,   Status:  Discontinued        \"Or\" Linked Group Details    12/08/21 1136    12/08/21 1136  terbutaline (BRETHINE) injection 0.25 mg  As Needed,   Status:  Discontinued         12/08/21 1136    12/08/21 1136  acetaminophen (TYLENOL) tablet 650 mg  Every 4 Hours PRN,   Status:  Discontinued         12/08/21 1136    12/08/21 1136  VTE Prophylaxis Not Indicated: Obesity-BMI >30 (1); </= 3 (Low Risk)  Once         12/08/21 1136    12/08/21 1136  Inpatient Anesthesiology Consult  Once,   Status:  Canceled        Specialty:  Anesthesiology  Provider:  (Not yet assigned)    12/08/21 1136    12/08/21 1135  ondansetron (ZOFRAN) tablet 4 mg  Every 6 Hours PRN,   Status:  Discontinued        \"Or\" Linked Group Details    12/08/21 1136    12/08/21 1135  ondansetron (ZOFRAN) injection 4 mg  Every 6 Hours PRN,   Status:  Discontinued        \"Or\" Linked Group Details    12/08/21 1136    12/08/21 1135  Admit To Obstetrics Inpatient  Once         12/08/21 1136    12/08/21 1135  Obtain Informed Consent  Once,   Status:  Canceled         12/08/21 1136    12/08/21 1135  Vital Signs Per Hospital Policy  Per Hospital Policy,   Status:  Canceled         12/08/21 1136    12/08/21 1135  Mini-Prep Prior to Delivery  Once,   Status:  Canceled         12/08/21 1136    12/08/21 1135  Continuous Fetal Monitoring With NST on Admission and Prior to Initiation of Oxytocin.  Per Order Details,   Status:  Canceled        Comments: Continuous Fetal Monitoring With NST on Admission & Prior to Initiation of Oxytocin.    12/08/21 1136    12/08/21 1135  External Uterine " Contraction Monitoring  Per Hospital Policy,   Status:  Canceled         21 1136    21 1135  Notify Provider (Specified)  Until Discontinued,   Status:  Canceled         21 1136    21 1135  Notify Provider of Tachysystole (Per Hospital Algorithm)  Until Discontinued,   Status:  Canceled         21 1136    21 1135  Notify Provider if Membranes Ruptured, Bleeding Greater Than 1 Pad Per Hour, Fetal Heart Tone Abnormality or Severe Pain  Until Discontinued,   Status:  Canceled         21 1136    21 1135  Initiate Group Beta Strep (GBS) Prophylaxis Protocol, If Criteria Met  Continuous,   Status:  Canceled        Comments: NO TREATMENT RECOMMENDED IF: 1) Maternal GBS Status Known Negative 2) Scheduled  Birth With Intact Membranes, Not in Labor 3) Maternal GBS Status Unknown, No Risk Factors  TREAT WITH ANTIBIOTICS IF:  1) Maternal GBS Status Known Positive 2) Maternal GBS Status Unknown With Risk Factors: a)  Previous Infant Affected By GBS Infection b) GBS Urinary Tract Infection (UTI) or Bacteriuria During Pregnancy c) Unexplained Maternal Fever (100.4F (38C) or Greater) During Labor d)  Prolonged Rupture of Membranes (18 or More Hours) e) Gestational Age Less Than 37 Weeks    21 1136    21 1135  NPO Diet NPO Except: Ice Chips  Diet Effective Now,   Status:  Canceled         21 1136    21 1135  CBC (No Diff)  Once         21 1136    21 1135  Type & Screen  Once         21 1136    21 1135  Insert Peripheral IV  Once,   Status:  Canceled         21 1136    21 1135  Saline Lock & Maintain IV Access  Continuous,   Status:  Canceled         21 1136    21 1134  Position Change - For Intra-Uterine Resusitation for Hypertonus, HyperStimulation or Non-Reassuring Fetal Status  As Needed,   Status:  Canceled       21 1136    21 1134  lidocaine PF 1% (XYLOCAINE) injection 5 mL  As Needed,    Status:  Discontinued         12/08/21 1136    12/08/21 1134  sodium chloride 0.9 % flush 10 mL  As Needed,   Status:  Discontinued         12/08/21 1136    12/08/21 1134  lactated ringers bolus 1,000 mL  Once As Needed,   Status:  Discontinued         12/08/21 1136    12/08/21 0953  Rapid Assay For ROM - Amniotic Fluid, Amniotic Sac  Once         12/08/21 0952    12/08/21 0952  Measure Blood Pressure  Once,   Status:  Canceled        Comments: Notify Provider if Greater Than 140/90    12/08/21 0952    12/08/21 0952  Measure Heart Rate  Once,   Status:  Canceled         12/08/21 0952    12/08/21 0952  Check Temperature  Once,   Status:  Canceled         12/08/21 0952    12/08/21 0952  Check Respiratory Rate  Once,   Status:  Canceled         12/08/21 0952    12/08/21 0952  Continuous Fetal Monitoring With NST on Admission and Prior to Initiation of Oxytocin.  Per Order Details,   Status:  Canceled        Comments: Continuous Fetal Monitoring With NST on Admission & Prior to Initiation of Oxytocin.    12/08/21 0952    12/08/21 0952  Intermittent Auscultation FOR LOW RISK PATIENTS - NST on Admission Along With Intermittent Auscultation of Fetal Heart Tones.  Per Order Details,   Status:  Canceled        Comments: Intermittent Auscultation FOR LOW RISK PATIENTS - NST on Admission Along With Intermittent Auscultation of Fetal Heart Tones.    12/08/21 0952    12/08/21 0952  For Antepartum Patients Greater Than 24 Weeks - NST Daily and Monitor Fetal Heart Tones for One Hour 3 Times Daily and PRN.  Per Order Details,   Status:  Canceled        Comments: For Antepartum Patients Greater Than 24 Weeks - NST Daily & Monitor Fetal Heart Tones For One Hour 3 Times Daily & PRN.    12/08/21 0952    12/08/21 0952  For Antepartum Patients Less Than 24 Weeks - Document Fetal Heart Tones Daily and PRN.  Per Order Details,   Status:  Canceled        Comments: For Antepartum Patients Less Than 24 Weeks - Document Fetal Heart Tones Daily  & PRN.    12/08/21 0952 12/08/21 0952  External Uterine Contraction Monitoring  Per Hospital Policy,   Status:  Canceled         12/08/21 0952    12/08/21 0952  Bed Rest With Bathroom Privileges  Once,   Status:  Canceled         12/08/21 0952    12/08/21 0952  Notify Provider  Until Discontinued,   Status:  Canceled         12/08/21 0952    12/08/21 0952  Cervical Check  Once,   Status:  Canceled        Comments: If patient is > 36 weeks and presents with complaints of labor.    12/08/21 0952 12/08/21 0952  Notify Provider  Until Discontinued,   Status:  Canceled        Comments: Vaginal Bleeding In Patients < 36 Weeks, Category II Or III Fetal Heart Rate Tracing, Tachysystole, Or Pain Unrelated To Uterine Contractions    12/08/21 0952 12/08/21 0952  NPO Diet NPO Except: Ice Chips  Diet Effective Now,   Status:  Canceled         12/08/21 0952    12/08/21 0952  Oxygen Therapy- Non-Rebreather Mask; 12 LPM; Titrate for SPO2: 90% - 95%  Continuous,   Status:  Canceled        Comments: In patients presenting in respiratory distress related to cardiopulmonary illness or disease OR in the presence of a non-reassuring fetal heart rate tracing.    12/08/21 0952 12/08/21 0952  Initiate Observation Status  Once         12/08/21 0952 12/08/21 0000  Group B Streptococcus Culture - Swab, Vaginal/Rectum        Comments: This is an external result entered through the Results Console.      12/08/21 1246    12/08/21 0000  Hepatitis C Antibody        Comments: This is an external result entered through the Results Console.      12/08/21 1246    12/08/21 0000  Hepatitis B Surface Antigen        Comments: This is an external result entered through the Results Console.      12/08/21 1246    12/08/21 0000  RPR        Comments: This is an external result entered through the Results Console.      12/08/21 1246    12/08/21 0000  Rubella Antibody, IgG        Comments: This is an external result entered through the Results  Console.      21 1246    21 0000  HIV-1 Antibody, EIA        Comments: This is an external result entered through the Results Console.      21 124    Unscheduled  Apply witch hazel pads / TUCKS to perineum as needed for comfort PRN  As Needed       21    Unscheduled  Warm compress  As Needed       21    Unscheduled  Apply ace wrap, tight bra, or binder  As Needed       21    Unscheduled  Apply ice packs  As Needed       21                   Operative/Procedure Notes (last 72 hours)      Britt Lee MD at 21 [id number]          [facility]  Vaginal Delivery Note    Patient Name: Gloria Ott  :  1995  MRN:  [id number]      Delivery     Delivery: Vaginal, Spontaneous     YOB: 2023    Time of Birth: 10:56 PM      Anesthesia: Epidural     Delivering clinician: Britt Lee MD       Infant    Findings: Viable female infant    Infant observations: Weight: No birth weight on file.   Observations/Comments:  scale 4      Apgars: 8  @ 1 minute /    9  @ 5 minutes     Placenta, Cord, and Fluid    Placenta delivered  Spontaneous   at  2021 11:00 PM     Cord: 3 vessels  present.   Cord blood obtained: Yes    Cord gases obtained:  No      Repair    Episiotomy: No   Lacerations: none   Estimated Blood Loss:   50 mls.          Delivery narrative: The patient is a 26 y.o.  at 37w4d.  Presented with SROM. Augmented with pitocin. Progressed normally to C/C/+1. Fetal status reassuring throughout.  of viable female infant  @ 10:56 PM  over an intact perineum. ASDE. No birth weight on file. .  Placenta delivered spontaneously, intact with 3 vessel cord. Cervix and rectum intact. Mother and baby recovering good condition.       Britt Lee MD  21  23:07 EST        Electronically signed by Britt Lee MD at 21 [phone]          Physician Progress Notes (last 72 hours)      Lorri Núñez APRN at 21  0910          [facility]  Vaginal Delivery Progress Note    Patient Name: Gloria Ott  :  1995  MRN:  [id number]      Subjective   Postpartum Day 1: Vaginal Delivery of a female infant.     The patient feels well without complaints.  Her pain is well controlled.  Reports normal lochia.     The patient plans to breastfeed.    Objective     Vital Signs Range for the last 24 hours  Temperature: Temp:  [97 °F (36.1 °C)-98.7 °F (37.1 °C)] 97.7 °F (36.5 °C)       BP: BP: ()/(51-91) 107/67   Pulse: Heart Rate:  [] 69   Respirations: Resp:  [16-20] 16                       Physical Exam:  General: Awake and alert  Abdomen: Fundus: firm, non tender, 1 below umbilicus  Extremities:  trace edema, NT     Labs:     Results from last 7 days   Lab Units 21  0821 21  1208   WBC 10*3/mm3 12.06* 10.60   HEMOGLOBIN g/dL 10.6* 11.3*   HEMATOCRIT % 33.0* 34.0   PLATELETS 10*3/mm3 198 212       Prenatal labs results reviewed:  Yes   Rubella:  Immune  Rh Status:    RH type   Date Value Ref Range Status   2021 Positive  Final         Assessment/Plan  : 1. PPD1 S/P  - Doing well, continue usual cares.           Pregnancy          CLIF Mathur  2021  09:18 EST    Electronically signed by Lorri Núñez APRN at 21 0918       Consult Notes (last 72 hours)  Notes from 21 1120 through 21 1120   No notes of this type exist for this encounter.

## 2021-12-09 NOTE — ANESTHESIA POSTPROCEDURE EVALUATION
Patient: Gloria Ott    Procedure Summary     Date: 12/08/21 Room / Location:     Anesthesia Start: 1652 Anesthesia Stop: 2256    Procedure: LABOR ANALGESIA Diagnosis:     Scheduled Providers:  Provider: Luca William MD    Anesthesia Type: epidural ASA Status: 2          Anesthesia Type: epidural    Vitals  Vitals Value Taken Time   /64 12/08/21 2315   Temp 36.9 °C (98.4 °F) 12/08/21 2300   Pulse 100 12/08/21 2315   Resp 18 12/08/21 2300   SpO2     Vitals shown include unvalidated device data.        Anesthesia Post Evaluation

## 2021-12-09 NOTE — PROGRESS NOTES
[facility]  Vaginal Delivery Progress Note    Patient Name: Gloria Ott  :  1995  MRN:  [id number]      Subjective   Postpartum Day 1: Vaginal Delivery of a female infant.     The patient feels well without complaints.  Her pain is well controlled.  Reports normal lochia.     The patient plans to breastfeed.    Objective     Vital Signs Range for the last 24 hours  Temperature: Temp:  [97 °F (36.1 °C)-98.7 °F (37.1 °C)] 97.7 °F (36.5 °C)       BP: BP: ()/(51-91) 107/67   Pulse: Heart Rate:  [] 69   Respirations: Resp:  [16-20] 16                       Physical Exam:  General: Awake and alert  Abdomen: Fundus: firm, non tender, 1 below umbilicus  Extremities:  trace edema, NT     Labs:     Results from last 7 days   Lab Units 21  0821 21  1208   WBC 10*3/mm3 12.06* 10.60   HEMOGLOBIN g/dL 10.6* 11.3*   HEMATOCRIT % 33.0* 34.0   PLATELETS 10*3/mm3 198 212       Prenatal labs results reviewed:  Yes   Rubella:  Immune  Rh Status:    RH type   Date Value Ref Range Status   2021 Positive  Final         Assessment/Plan  : 1. PPD1 S/P  - Doing well, continue usual cares.           Pregnancy          Lorri Rudolph Niya, CLIF  2021  09:18 EST

## 2021-12-09 NOTE — L&D DELIVERY NOTE
[facility]  Vaginal Delivery Note    Patient Name: Gloria Ott  :  1995  MRN:  [id number]      Delivery     Delivery: Vaginal, Spontaneous     YOB: 2023    Time of Birth: 10:56 PM      Anesthesia: Epidural     Delivering clinician: Britt Lee MD       Infant    Findings: Viable female infant    Infant observations: Weight: No birth weight on file.   Observations/Comments:  scale 4      Apgars: 8  @ 1 minute /    9  @ 5 minutes     Placenta, Cord, and Fluid    Placenta delivered  Spontaneous   at  2021 11:00 PM     Cord: 3 vessels  present.   Cord blood obtained: Yes    Cord gases obtained:  No      Repair    Episiotomy: No   Lacerations: none   Estimated Blood Loss:   50 mls.          Delivery narrative: The patient is a 26 y.o.  at 37w4d.  Presented with SROM. Augmented with pitocin. Progressed normally to C/C/+1. Fetal status reassuring throughout.  of viable female infant  @ 10:56 PM  over an intact perineum. ASDE. No birth weight on file. .  Placenta delivered spontaneously, intact with 3 vessel cord. Cervix and rectum intact. Mother and baby recovering good condition.       Britt Lee MD  21  23:07 EST

## 2021-12-09 NOTE — LACTATION NOTE
This note was copied from a baby's chart.  P5,37.4 baby. Mom BF her other children. Informed PT that LC is here to help with BF tonight. Offered assistance but mother declined, said she will call later, when baby is due to BF if she needs help. Reports infant is latching well so far.PT denies any questions and concerns at this time. She hasw PBP. Encouraged to call LC if needing further assistance.

## 2021-12-09 NOTE — LACTATION NOTE
Mom reports her milk is coming in and baby is nursing well. She denies questions or needing assistance at this time. Encouraged mom to call  if needing assistance. Mom has personal pump    Lactation Consult Note    Evaluation Completed: 2021 08:23 EST  Patient Name: Gloria Ott  :  1995  MRN:  [id number]     REFERRAL  INFORMATION:                                         DELIVERY HISTORY:        Skin to skin initiation date/time: 2021  10:56 PM   Skin to skin end date/time: 2021  12:40 AM        MATERNAL ASSESSMENT:                               INFANT ASSESSMENT:  Information for the patient's :  Yury OttJesi [[id number]]   No past medical history on file.                                                                                                     MATERNAL INFANT FEEDING:                                                                       EQUIPMENT TYPE:                                 BREAST PUMPING:          LACTATION REFERRALS:

## 2021-12-10 VITALS
OXYGEN SATURATION: 98 % | WEIGHT: 219 LBS | TEMPERATURE: 98.5 F | HEART RATE: 73 BPM | DIASTOLIC BLOOD PRESSURE: 79 MMHG | HEIGHT: 60 IN | SYSTOLIC BLOOD PRESSURE: 134 MMHG | RESPIRATION RATE: 16 BRPM | BODY MASS INDEX: 43 KG/M2

## 2021-12-10 RX ORDER — IBUPROFEN 600 MG/1
600 TABLET ORAL EVERY 8 HOURS PRN
Qty: 60 TABLET | Refills: 0 | Status: SHIPPED | OUTPATIENT
Start: 2021-12-10

## 2021-12-10 RX ORDER — OXYCODONE HYDROCHLORIDE AND ACETAMINOPHEN 5; 325 MG/1; MG/1
2 TABLET ORAL EVERY 4 HOURS PRN
Qty: 24 TABLET | Refills: 0 | Status: SHIPPED | OUTPATIENT
Start: 2021-12-10 | End: 2021-12-12

## 2021-12-10 RX ADMIN — HYDROCORTISONE 1 APPLICATION: 1 CREAM TOPICAL at 09:22

## 2021-12-10 RX ADMIN — PRENATAL VITAMINS-IRON FUMARATE 27 MG IRON-FOLIC ACID 0.8 MG TABLET 1 TABLET: at 09:21

## 2021-12-10 RX ADMIN — OXYCODONE AND ACETAMINOPHEN 1 TABLET: 5; 325 TABLET ORAL at 09:21

## 2021-12-10 RX ADMIN — IBUPROFEN 600 MG: 600 TABLET, FILM COATED ORAL at 00:25

## 2021-12-10 RX ADMIN — IBUPROFEN 600 MG: 600 TABLET, FILM COATED ORAL at 09:21

## 2021-12-10 RX ADMIN — DOCUSATE SODIUM 100 MG: 100 CAPSULE, LIQUID FILLED ORAL at 09:21

## 2021-12-10 RX ADMIN — OXYCODONE AND ACETAMINOPHEN 1 TABLET: 5; 325 TABLET ORAL at 04:23

## 2021-12-10 RX ADMIN — Medication 1 APPLICATION: at 10:53

## 2021-12-10 NOTE — PLAN OF CARE
Problem: Adult Inpatient Plan of Care  Goal: Plan of Care Review  Outcome: Met  Flowsheets (Taken 12/10/2021 0932)  Progress: improving  Plan of Care Reviewed With:   patient   spouse  Outcome Summary: pt vssm breastfeeding well. pain well controlled with motrin and percocet. dischare home today. follow up in 6 weeks with ob.  Goal: Patient-Specific Goal (Individualized)  Outcome: Met  Goal: Absence of Hospital-Acquired Illness or Injury  Outcome: Met  Intervention: Identify and Manage Fall Risk  Recent Flowsheet Documentation  Taken 12/10/2021 0921 by Silvana Merino RN  Safety Promotion/Fall Prevention: safety round/check completed  Intervention: Prevent and Manage VTE (venous thromboembolism) Risk  Recent Flowsheet Documentation  Taken 12/10/2021 0921 by Silvana Merino RN  VTE Prevention/Management:   bilateral   dorsiflexion/plantar flexion performed   bleeding risk factor(s) identified  Goal: Optimal Comfort and Wellbeing  Outcome: Met  Intervention: Provide Person-Centered Care  Recent Flowsheet Documentation  Taken 12/10/2021 0921 by Silvana Merino RN  Trust Relationship/Rapport:   choices provided   care explained   emotional support provided   empathic listening provided   questions answered   questions encouraged   reassurance provided   thoughts/feelings acknowledged  Goal: Readiness for Transition of Care  Outcome: Met     Problem: Adjustment to Role Transition (Postpartum Vaginal Delivery)  Goal: Successful Maternal Role Transition  Outcome: Met  Intervention: Support Maternal Role Transition  Recent Flowsheet Documentation  Taken 12/10/2021 0921 by Silvana Merino RN  Supportive Measures: active listening utilized     Problem: Bleeding (Postpartum Vaginal Delivery)  Goal: Hemostasis  Outcome: Met     Problem: Infection (Postpartum Vaginal Delivery)  Goal: Absence of Infection Signs and Symptoms  Outcome: Met     Problem: Pain (Postpartum Vaginal Delivery)  Goal: Acceptable Pain  Control  Outcome: Met  Intervention: Prevent or Manage Pain  Recent Flowsheet Documentation  Taken 12/10/2021 0921 by Silvana Merino, RN  Pain Management Interventions:   see MAR   quiet environment facilitated   pain management plan reviewed with patient/caregiver     Problem: Urinary Retention (Postpartum Vaginal Delivery)  Goal: Effective Urinary Elimination  Outcome: Met     Problem: Breastfeeding  Goal: Effective Breastfeeding  Outcome: Met  Intervention: Support Exclusive Breastfeeding Success  Recent Flowsheet Documentation  Taken 12/10/2021 0921 by Silvana Merino RN  Supportive Measures: active listening utilized   Goal Outcome Evaluation:  Plan of Care Reviewed With: patient, spouse        Progress: improving  Outcome Summary: pt vssm breastfeeding well. pain well controlled with motrin and percocet. dischare home today. follow up in 6 weeks with ob.

## 2021-12-10 NOTE — DISCHARGE SUMMARY
Date of Discharge:  12/10/2021    Discharge Diagnosis: vaginal delivery    Presenting Problem/History of Present Illness  Pregnancy [Z34.90]       Hospital Course  Patient is a 26 y.o. female presented with SROM at term.   Delivered viable female infant per Dr. Lee.  Is having some upper back pain, mostly along spine.  Had diff w/ epid placement so thinks r/t that.  NAD.  D/w heat, nsaid, posture. Otherwise ready for d/c home.      Procedures Performed         Consults:   Consults     No orders found from 2021 to 2021.          Condition on Discharge:   Subjective   Postpartum Day 2 Vaginal Delivery.    The patient feels well without complaints.    Vital Signs  Temp:  [97.9 °F (36.6 °C)-98.1 °F (36.7 °C)] 97.9 °F (36.6 °C)  Heart Rate:  [71-79] 71  Resp:  [17-18] 18  BP: (111-121)/(66-76) 111/66    Physical Exam:   General: Awake and alert   Abdomen: Fundus: firm, non tender    Extremities:  Calves NT bilaterally    Assessment/Plan     PPD2  S/P  -   Stable for discharge. Instructions reviewed      Discharge Disposition  Home or Self Care    Discharge Medications     Discharge Medications      New Medications      Instructions Start Date   ibuprofen 600 MG tablet  Commonly known as: ADVIL,MOTRIN   600 mg, Oral, Every 8 Hours PRN      oxyCODONE-acetaminophen 5-325 MG per tablet  Commonly known as: PERCOCET   2 tablets, Oral, Every 4 Hours PRN         Continue These Medications      Instructions Start Date   prenatal (CLASSIC) vitamin  tablet  Generic drug: prenatal vitamin   Oral, Daily               The patient has been prescribed a controlled substance.  She has been counseled on the risks associated with using the medication.   The addictive potential of this medication and alternatives were discussed carefully with this patient and she demonstrated understanding.  A EMERY report has been obtained and reviewed.       Activity at Discharge: restrictions reviewed    Follow-up Appointments  No  future appointments.      Test Results Pending at Discharge       Lorri Núñez, APRN  12/10/21  09:41 EST

## 2021-12-10 NOTE — LACTATION NOTE
This note was copied from a baby's chart.  Informed PT that LC is here again to help with BF . Mom reports infant is latching well.PT denies any questions and concerns at this time. Encouraged to call LC if needing further assistance.

## 2021-12-10 NOTE — LACTATION NOTE
Patient states infant has a good , strong latch and denies nipple damage. She is concerned that baby seems particularly spitty after nursing. Enc her to hold baby upright on mom's chest for 15 mins after nursing. Has info [facility] services after discharge.